# Patient Record
Sex: MALE
[De-identification: names, ages, dates, MRNs, and addresses within clinical notes are randomized per-mention and may not be internally consistent; named-entity substitution may affect disease eponyms.]

---

## 2020-06-04 PROBLEM — Z00.00 ENCOUNTER FOR PREVENTIVE HEALTH EXAMINATION: Status: ACTIVE | Noted: 2020-06-04

## 2020-06-05 ENCOUNTER — APPOINTMENT (OUTPATIENT)
Dept: ORTHOPEDIC SURGERY | Facility: CLINIC | Age: 70
End: 2020-06-05
Payer: MEDICAID

## 2020-06-05 VITALS
DIASTOLIC BLOOD PRESSURE: 80 MMHG | SYSTOLIC BLOOD PRESSURE: 110 MMHG | WEIGHT: 138 LBS | HEART RATE: 76 BPM | OXYGEN SATURATION: 99 %

## 2020-06-05 DIAGNOSIS — C90.00 MULTIPLE MYELOMA NOT HAVING ACHIEVED REMISSION: ICD-10-CM

## 2020-06-05 DIAGNOSIS — Z86.79 PERSONAL HISTORY OF OTHER DISEASES OF THE CIRCULATORY SYSTEM: ICD-10-CM

## 2020-06-05 DIAGNOSIS — M89.9 DISORDER OF BONE, UNSPECIFIED: ICD-10-CM

## 2020-06-05 DIAGNOSIS — M79.602 PAIN IN LEFT ARM: ICD-10-CM

## 2020-06-05 DIAGNOSIS — Z78.9 OTHER SPECIFIED HEALTH STATUS: ICD-10-CM

## 2020-06-05 DIAGNOSIS — M84.522A PATHOLOGICAL FRACTURE IN NEOPLASTIC DISEASE, LEFT HUMERUS, INITIAL ENCOUNTER FOR FRACTURE: ICD-10-CM

## 2020-06-05 DIAGNOSIS — Z85.9 PERSONAL HISTORY OF MALIGNANT NEOPLASM, UNSPECIFIED: ICD-10-CM

## 2020-06-05 PROCEDURE — 99204 OFFICE O/P NEW MOD 45 MIN: CPT

## 2020-06-05 NOTE — DATA REVIEWED
[Imaging Present] : Present [de-identified] : X-rays of the left humerus from 5/14/2020 shows significant destruction in the midportion of the left humerus.  The cortex is completely involved however the intramedullary area from below the surgical neck to 10 cm from the elbow show significant destruction.  PET scan from 928 similarly shows the same disease.  Overall the metabolism has gone down in all of the bony lesions however there are significant amount of bony disease throughout.  There are no other areas of impending fracture.

## 2020-06-05 NOTE — PHYSICAL EXAM
[Oriented To Time, Place, And Person] : Oriented to person, place, and time [FreeTextEntry1] : On exam the patient stands in good balance.  He is able to move appropriately when walking as well as his dominant right arm.  His left arm has very poor range of motion.  He has pain in the left shoulder and does not want to move it actively.  Passively I can get him to 60 degrees range of motion arc.\par \par His left elbow also has full passive range of motion although has pain with any active motion in the middle of the arm.  He has no pain distal to the elbow and is neurovascularly intact.  He has no lymphadenopathy. [General Appearance - Well-Appearing] : Well appearing [General Appearance - Well Nourished] : well nourished [Mood] : the mood was normal [Affect] : The affect was normal. [Impaired Insight] : Insight and judgment were intact [Heart Rate And Rhythm] : heart rate was normal and rhythm regular [Sclera] : the sclera and conjunctiva were normal [Neck Cervical Mass (___cm)] : no neck mass was observed [] : No respiratory distress [Abdomen Soft] : Soft [Normal Station and Gait] : gait and station were normal [Swelling] : no swelling [Skin Changes - Describe changes:] : No skin changes noted [Masses] : no masses [UE Motor Strength Normal unless otherwise noted:] : 5/5 strength in bilateral upper extremities unless otherwise noted. [Full UE ROM unless otherwise noted:] : Full range of motion unless otherwise noted. [Normal] : Sensation intact to light touch. [Tenderness] : tenderness

## 2020-06-05 NOTE — HISTORY OF PRESENT ILLNESS
[FreeTextEntry1] : This is a 69-year-old gentleman sent over by Dr. Serge Novak from [facility] who has been complaining of increasing left arm pain in the past month.  He does not member any trauma or other any other injury.  He has a history of multiple myeloma and has been on medication for 5 years.  Recently his markers have gone up however he had a PET scan which showed a pathologic lesion in his left arm where he is having pain.  Overall the rest of his bony disease has gone down.  He was sent for further surgical opinion. [Worsening] : worsening [Bending] : worsened by bending [___ mths] : [unfilled] month(s) ago [6] : currently ~his/her~ pain is 6 out of 10 [Direct Pressure] : worsened by direct pressure [Joint Movement] : worsened by joint movement [Lifting] : worsened by lifting

## 2020-06-05 NOTE — CONSULT LETTER
[FreeTextEntry1] : \par After evaluating your patient and reviewing the studies presented we have come to a mutually agreeable plan. \par \par Please see my note below.\par \par Should you have further questions, please feel free to call and discuss the care of your patient.\par \par Thank you for the confidence of your referral.\par \par Sincerely, \par \par Erasto Johnson MD \par Musculoskeletal Oncology \par [address], [address], \[place] [address] \par [phone] [Dear  ___] : Dear  [unfilled], [FreeTextEntry2] : Serge Novak MD\par [facility] \par [address]\[place] [address]

## 2020-06-05 NOTE — DISCUSSION/SUMMARY
[Surgical risks reviewed] : Surgical risks reviewed [Interested in Proceeding] : Patient (and family) expressed understanding and interest in proceeding with the plan as outlined [de-identified] : I discussed surgical stabilization of the bone with the patient.  As this is the majority of the middle of the canal I think intramedullary fixation would be appropriate.  I think using Illuminoss would be the most helpful as there is no obvious fracture yet however I still need to try and support the bone.  I would try and curette the majority of the myelomatous lesion to support the implant as well.  Hopefully this will help him get moving better.  I will see him again in time for surgery.  I discussed this with the oncologist to this in 2 weeks.\par \par \par If imaging was ordered, the patient was told to make an appointment to review findings right after all imaging is completed.\par \par We discussed risks, benefits and alternatives. Rationale of care was reviewed and all questions were answered. Patient (and family) had all questions answered to her degree of the level of satisfaction. Patient (and family) expressed understanding and interest in proceeding with the plan as outlined.\par \par \par \par \par This note was done with a voice recognition transcription software and any typos are related to this rather than medical error. Surgical risks reviewed. Patient (and family) had all questions answered to an agreeable level of satisfaction. Patient (and family) expressed understanding and interest in proceeding with the plan as outlined.  \par  [All Questions Answered] : Patient (and family) had all questions answered to an agreeable level of satisfaction

## 2020-06-16 ENCOUNTER — APPOINTMENT (OUTPATIENT)
Dept: ORTHOPEDIC SURGERY | Facility: AMBULATORY MEDICAL SERVICES | Age: 70
End: 2020-06-16
Payer: MEDICAID

## 2020-06-16 PROCEDURE — 24516 TX HUMRL SHAFT FX IMED IMPLT: CPT | Mod: LT
